# Patient Record
(demographics unavailable — no encounter records)

---

## 2018-06-23 NOTE — CT
CT OF THE ABDOMEN AND PELVIS WITH CONTRAST PER AORTIC DISSECITON PROTOCOL:

 

HISTORY: 

Pain between the shoulder blades and thoracic back pain.

 

TECHNIQUE: 

Multiple contiguous axial images were obtained in a CTA of the chest and abdomen with contrast per 
rtic dissection protocol.  Three-D sagittal and coronal MIP reformats were performed.

 

FINDINGS: 

The heart is normal in size without focal cardiac abnormality.  Hilar or mediastinal lymphadenopathy 
is seen.

 

A calcified granuloma is seen in the left lower lobe.  No focal infiltrates are seen in the lungs.  N
o suspicious pulmonary nodules are identified.  No pneumothorax or pleural effusion are seen.

 

The thoracic aorta is normal in caliber without evidence of aneurysmal dilatation or dissection.

 

The bones of the thorax and chest wall soft tissues are unremarkable.

 

There is diffuse fatty infiltration of the liver.  The gallbladder, kidneys, adrenal gland, spleen, a
nd pancreas are unremarkable.  No free air, free fluid, or stranding changes are seen in the abdomen.


 

There are a few scattered diverticula in the colon.  The small bowel is unremarkable.  No abdominal a
denopathy is seen.

 

There is a small amount of atherosclerotic disease in the infrarenal aorta.  There is no evidence of 
aneurysmal dilatation or dissection of the aorta.

 

Degenerative changes are seen in the lumbar spine.  There is a small fat-containing umbilical hernia.


 

IMPRESSION: 

1.  No evidence of dissection or aneurysmal dilatation of the aorta.

 

2.  Fatty liver.

 

3.  Diverticulosis.

 

POS: [facility]

## 2018-06-23 NOTE — PDOC.FPRHP
- History of Present Illness


Chief Complaint: Bilateral back pressure


History of Present Illness: 


71 yo female with pmh of HTN, controlled DM, and hypothyroid who presents with 

2 days of back pressure like pain and cold sweats.  In the month prior she has 

two events where she became short of breath unexpectedly with minor exertion.  

This resolved with rest.  Yesterday she started having pain on her back, 

underneath her shoulders.  no associated sob with this but she was diaphoretic 

per ER.  Now she is feeling much better with complete resolution of symptoms.  

She did not get nitro to relieve her symptoms.  





ED Course: 


She had CT dissection protocol that was negative.  








- Allergies/Adverse Reactions


 Allergies











Allergy/AdvReac Type Severity Reaction Status Date / Time


 


No Known Allergies Allergy   Verified 06/23/18 19:20














- Home Medications


 











 Medication  Instructions  Recorded  Confirmed  Type


 


Aspirin [Ecotrin] 81 mg PO DAILY 06/23/18 06/23/18 History


 


Levothyroxine Sodium [Synthroid] 137 mcg PO DAILY 06/23/18 06/23/18 History


 


Lisinopril/Hydrochlorothiazide 1 tablet PO DAILY 06/23/18 06/23/18 History





[Lisinopril-Hctz 20-25 mg Tab]    


 


metFORMIN HCl [Metformin HCl ER] 1,000 mg PO BID 06/23/18 06/23/18 History














- History


PMHx: HTN, DM, hypothyroid


 


PSHx: CS x3   





FHx: father had CABG at 65


 


Social: appx 20 pack year hx. quit 8 years ago.  no etoh or recreational drugs.

  


 








- Review of Systems


General: reports: other (cold sweats).  denies: fever/chills, weight/appetite/

sleep changes, night sweats, fatigue


Eyes: denies: eye pain, vision changes


ENT: denies: nasal congestion, rhinorrhea


Respiratory: reports: exercise intolerance.  denies: cough, congestion, 

shortness of breath


Cardiovascular: reports: chest pain.  denies: palpitation, edema, paroxysmal 

nocturnal dyspnea, orthopnea


Gastrointestinal: denies: nausea, vomiting, diarrhea


Genitourinary: denies: incontinence, dysuria, polyuria


Skin: denies: rashes, lesions, jaundice


Musculoskeletal: denies: pain, tenderness, stiffness


Neurological: denies: numbness, syncope


Psychological: denies: anxiety, depression





- Vital signs


BP: 129/69  HR: 83 RR: 20 Tmax: 98.5 Pox: 95% on ra  Wt: 90kg   








- Physical Exam


Constitutional: NAD, awake, alert and oriented, well developed


HEENT: normocephalic and atraumatic, PERRLA, EOMI, no scleral icterus, grossly 

normal vision


Neck: supple, FROM, trachea midline, no LAD, no thyromegaly, no bruits


Chest: no-tender to palpation, no lesions


Heart: RRR, normal S1/S2, no murmurs/rubs/gallops, pulses present, no edema


Lungs: CTAB, no respiratory distress, good air movement, no rales/rhonchi, no 

wheezing, no retractions


Abdomen: soft, non-tender, bowel sounds present, no masses/distention


Musculoskeletal: normal structure, normal tone, ROM grossly normal


Neurological: no focal deficit, CN II-XII intact, normal sensation


Skin: no rash/lesions, good turgor, capillary refill <2 seconds, no jaundice


Heme/Lymphatic: no unusual bruising or bleeding, no purpura, no petechia, no LAD


Psychiatric: normal mood and affect, good judgment and insight, intact recent 

and remote memory





FMR H&P: Results





- Labs


Lab results: 





 Laboratory Tests











  11/29/16 11/29/16 06/23/18





  08:05 08:05 13:10


 


WBC   


 


Hgb   


 


Hct   


 


Plt Count   


 


Sodium   


 


Potassium   


 


Chloride   


 


Carbon Dioxide   


 


BUN   


 


Creatinine   


 


Glucose   


 


Hemoglobin A1c   6.9 H 


 


Calcium   


 


AST   


 


ALT   


 


Troponin I    Less than  0.010


 


Albumin   


 


LDL Cholesterol, Calc  89  


 


HDL Cholesterol  42  














  06/23/18 06/23/18 06/23/18





  13:10 13:10 16:38


 


WBC   9.2 


 


Hgb   13.7 


 


Hct   44.7 


 


Plt Count   340 


 


Sodium  144  


 


Potassium  3.6  


 


Chloride  105  


 


Carbon Dioxide  26  


 


BUN  15  


 


Creatinine  0.82  


 


Glucose  109  


 


Hemoglobin A1c   


 


Calcium  10.0  


 


AST  24  


 


ALT  37  


 


Troponin I    Less than  0.010


 


Albumin  4.4  


 


LDL Cholesterol, Calc   


 


HDL Cholesterol   














- EKG Interpretation


EKG: 


NSR with old q waves in inferior leads.  Non specific T waves in inferior 

leads.  








- Radiology Interpretation


  ** CT scan - chest


Status: report reviewed by me


Additional comment: 


Negative for disection, pneumothorax and pleural effusion








FMR H&P: A/P





- Problem List


(1) Atypical chest pain


Current Visit: Yes   Status: Acute   Priority: High   Code(s): R07.89 - OTHER 

CHEST PAIN   





(2) HTN (hypertension)


Current Visit: Yes   Status: Chronic   Code(s): I10 - ESSENTIAL (PRIMARY) 

HYPERTENSION   


Qualifiers: 


   Hypertension type: essential hypertension   Qualified Code(s): I10 - 

Essential (primary) hypertension   





(3) Diabetes mellitus


Current Visit: Yes   Status: Chronic   Code(s): E11.9 - TYPE 2 DIABETES 

MELLITUS WITHOUT COMPLICATIONS   


Qualifiers: 


   Diabetes mellitus type: type 2   Diabetes mellitus long term insulin use: 

without long term use   Diabetes mellitus complication status: without 

complication   Qualified Code(s): E11.9 - Type 2 diabetes mellitus without 

complications   





(4) Hypothyroid


Current Visit: Yes   Status: Chronic   Code(s): E03.9 - HYPOTHYROIDISM, 

UNSPECIFIED   





- Plan


1. Atypical chest/back/shoulder pain-  Patient has some concerning features 

such as recent episodes of shortness of breath and the diaphoresis associated 

with todays event.  CT dissection was negative and trops negative x2. risk 

factors include DM and hx of smoking.  Will give patient 325 of ASA.  ECG 

showed some old q waves that were present in previous EKG, but no signs of 

acute ischemic changes.  Will get nuclear stress test.  Plan to follow up with 

Dr. Guillen outpatient for continued workup including echo.  


2. HTN- controlled on lisinopril/HCTZ.  continue


3. DM2- controlled per patient on metformin. Patient neither uses insulin or 

check glucoses at home.  continue metformin. check fasting BS tomorrow monring.

  


4. Hypothyroid- continue synthroid





Observation status.  expect one midnight.

## 2018-06-24 NOTE — ADD-HP
ADDENDUM

 

Please see the history and physical done by Dr. Nguyen for which I agree.  The patient was seen and 
evaluated, examined and discussed with the residents by bedside.

 

HISTORY OF PRESENT ILLNESS:  A 72-year-old female with some chest tightness, shortness of breath, and
 some back tightness on again, off again for the last month but was worse yesterday.  She is being tr
ansferred from an outside hospital where a CT dissection protocol was negative and so far, troponins 
have been negative, but past medical history includes hypertension, diabetes, hypothyroid, and family
 history of heart disease and was a smoker until by 8 years ago, so a lot of risk factors for heart d
isease who is coming in for stress test.  Actually, she was set up to see a cardiologist later this w
[place] for that, but symptoms worsened, so she came in.

 

Past medical history, past surgical history, family history, social history, review of systems, all p
er the resident's history and physical for which I agree.

 

PHYSICAL EXAMINATION:

VITAL SIGNS:  Stable.

GENERAL:  No apparent distress.  Pleasant affect.  Alert and oriented x3.

HEENT:  Moist mucosa.  Conjunctivae not pale.

CHEST:  Clear.

CARDIOVASCULAR:  Regular rate and rhythm.

ABDOMEN:  Benign.

EXTREMITIES:  Show no edema.

 

LABORATORY DATA AND IMAGING:  Initial labs really benign.  Negative troponins.  Sugar is fine at 109.
  EKG really just minimum changes.

 

ASSESSMENT AND PLAN:

1.  Chest pain with risk factors.  Plan is to go and get that stress test today and if it is normal, 
we will likely be able to send her out and treat this like it is reflux or something similar.  Certai
nly if it is abnormal, we will get Cardiology involved that she may need heart catheterization.

2.  Hypertension.  Continue home medicines.

3.  Diabetes.  Continue home medicines.

4.  Hypothyroidism.  Continue home medicines.

## 2018-06-24 NOTE — NM
NUCLEAR MEDICINE CARDIAC PERFUSION EXAMINATION WITH EJECTION FRACTION:

 

HISTORY: 

A 72-year-old female with chest pain, hypertension, diabetes, and family history of coronary artery d
isease.

 

TECHNIQUE: 

A single-day nuclear medicine cardiac perfusion examination was performed.  Rest images were obtained
 using 9.7 mCi of Technetium 99m sestamibi.  Stress images were obtained using 30.5 mCi of Technetium
 99m sestamibi and adenosine.

 

FINDINGS: 

Tomographic images show no fixed or reversible perfusion defects.  Gated images show normal wall alisia
on with an ejection fraction of greater than 70%. 

 

EDV is 56 mL.  LHR is 0.4.  TID is 1.0.

 

IMPRESSION: 

Unremarkable exam. 

 

POS: [facility]

## 2018-06-24 NOTE — PDOC.FM
- Subjective


Subjective: 


Pt rested well overnight. No recurrence of back pressure or diaphoresis. Denies 

complaints this AM.





- Objective


MAR Reviewed: Yes


Vital Signs & Weight: 


 Vital Signs (12 hours)











  Temp Pulse Resp BP BP Pulse Ox


 


 06/24/18 04:00  97.6 F  74  18  116/68   96


 


 06/24/18 00:22  97.7 F  64  16   124/64  92 L


 


 06/23/18 20:00  98.7 F  82  16   








 Weight











Weight                         90.265 kg














I&O: 


 











 06/23/18 06/24/18 06/25/18





 06:59 06:59 06:59


 


Intake Total  410 


 


Balance  410 











Additional Labs: 


Trops <0.01 x3


FLP: WNL


EKG Reviewed by me: Yes (inferior q waves)


Radiology Reviewed by me: Yes (CT dissection: no acute process)





Phys Exam





- Physical Examination


Constitutional: NAD


HEENT: PERRLA, moist MMs


Neck: supple, full ROM


Respiratory: no wheezing, no rales, clear to auscultation bilateral


Cardiovascular: RRR, no significant murmur, no rub


Gastrointestinal: soft, non-tender, no distention, positive bowel sounds


Musculoskeletal: no edema, pulses present


Neurological: non-focal, moves all 4 limbs


Psychiatric: normal affect, A&O x 3


Skin: normal turgor, cap refill <2 seconds





Dx/Plan


(1) Atypical chest pain


Code(s): R07.89 - OTHER CHEST PAIN   Status: Acute   


Plan: 


HEART score 3. Trops neg. No recurrence of pain. With periodic SOB on exertion 

and continued pain episodes, will order nuclear stress test this AM. NPO. Will 

discuss with PCP, Dr. Mcdonough, for additional info. Observation status with 

hopeful d/c today pending stress.








(2) Diabetes mellitus


Code(s): E11.9 - TYPE 2 DIABETES MELLITUS WITHOUT COMPLICATIONS   Status: 

Chronic   


Qualifiers: 


   Diabetes mellitus type: type 2   Diabetes mellitus long term insulin use: 

without long term use   Diabetes mellitus complication status: without 

complication   Qualified Code(s): E11.9 - Type 2 diabetes mellitus without 

complications   


Plan: 


Well controlled on oral metformin only. BG appropriate. Hold metformin.








(3) HTN (hypertension)


Code(s): I10 - ESSENTIAL (PRIMARY) HYPERTENSION   Status: Chronic   


Qualifiers: 


   Hypertension type: essential hypertension   Qualified Code(s): I10 - 

Essential (primary) hypertension   


Plan: 


Home meds








(4) Hypothyroid


Code(s): E03.9 - HYPOTHYROIDISM, UNSPECIFIED   Status: Chronic   


Plan: 


Continue home med

## 2018-06-25 NOTE — DIS-2
DATE OF ADMISSION:  06/23/2018

 

DATE OF DISCHARGE:  06/24/2018

 

RESIDENT:  Doc Velasco M.D.

 

ADMITTING ATTENDING:  Ivan Salazar M.D.

 

DISCHARGE ATTENDING:  Ivan Salazar M.D.

 

CONSULTS:  None.

 

PROCEDURES:  Nuclear medicine cardiac stress test showed no fixed or reversible 
perfusion defects with normal wall motion and EF of greater than 70%.  CT 
dissection protocol.  No evidence of dissection or aneurysmal dilation of the 
aorta.  Fatty liver noted.  Diverticulosis also noted.

 

PRIMARY DIAGNOSIS:  Atypical chest pain.

 

SECONDARY DIAGNOSES:

1.  Diabetes mellitus type 2.

2.  Hypertension.

3.  Hypothyroidism.

 

DISCHARGE MEDICATIONS:

1.  Aspirin 81 mg.

2.  Metformin 1000 mg b.i.d.

3.  Lisinopril/hydrochlorothiazide 20/25 mg.

4.  Levothyroxine 137 mcg.

 

DISCONTINUED MEDICATIONS:  None.

 

HISTORY OF PRESENT ILLNESS AND HOSPITAL COURSE:  The patient is a 72-year-old 
 female with past medical history of hypertension, uncontrolled type 2 
diabetes and hypothyroidism, who presented with 2 days of back pressure/pain 
and diaphoresis.  There was no associated shortness of breath or chest pain 
with these episodes.  Her symptoms resolved without treatment in the ED.  In 
the prior month, the patient endorsed 2 episodes of shortness of breath after 
minor exertion that resolved with rest.  She was in the process of being set up 
for a stress test through an outpatient cardiologist.  Her PCP, Dr. Mcdonough, 
was managing this.  In the ED, the patient had a CT dissection protocol that 
was negative.  The patient was initially seen in the [place] ER and transferred 
for rule out acute coronary syndrome.

1.  Atypical chest pain.  The patient's heart score was 3.  Troponins were 
negative x3.  There was no recurrence of pain in her back.  CT dissection 
protocol was negative.  With periodic shortness of breath on exertion and 
continued episodes of this nonspecific back discomfort, nuclear stress test was 
ordered which showed no reversible defects.  Her EF was noted to be greater 
than 70%.  Modifiable risk factors appear appropriately, managed with a fasting 
lipid panel and blood glucose within normal limits.  The patient is to be 
discharged with follow up with PCP in 3-5 days.

2.  Diabetes mellitus type 2.  The patient is well controlled on oral metformin 
only.  She does not remember her A1c's, but thinks they are appropriately in 
range.  No adjustments needed.

3.  Hypertension.  Home medications were continued.  Her blood pressure was 
less than 140/90 for the entirety of admission.

4.  Hypothyroidism.  Home medications were continued.

 

DISPOSITION:  Stable.

 

DISCHARGE INSTRUCTIONS:

1.  Location:  Home.

2.  Diet:  Heart healthy and low sodium and consistent carbohydrates.

3.  Activity:  As tolerated.

4.  Follow up with Dr. Mcdonough in 3-5 days.

 

ALEXUS